# Patient Record
Sex: FEMALE | Race: WHITE
[De-identification: names, ages, dates, MRNs, and addresses within clinical notes are randomized per-mention and may not be internally consistent; named-entity substitution may affect disease eponyms.]

---

## 2020-05-12 ENCOUNTER — HOSPITAL ENCOUNTER (EMERGENCY)
Dept: HOSPITAL 12 - ER | Age: 35
Discharge: HOME | End: 2020-05-12
Payer: COMMERCIAL

## 2020-05-12 VITALS — WEIGHT: 150 LBS | HEIGHT: 67 IN | BODY MASS INDEX: 23.54 KG/M2

## 2020-05-12 VITALS — DIASTOLIC BLOOD PRESSURE: 68 MMHG | SYSTOLIC BLOOD PRESSURE: 119 MMHG

## 2020-05-12 DIAGNOSIS — R05: Primary | ICD-10-CM

## 2020-05-12 DIAGNOSIS — R06.2: ICD-10-CM

## 2020-05-12 PROCEDURE — A4663 DIALYSIS BLOOD PRESSURE CUFF: HCPCS

## 2020-05-12 NOTE — NUR
Patient discharged to home in stable condition.  Written and verbal after care 
instructions given. 

Patient verbalizes understanding of instructions. Stressed follow up or return 
to ER for worsening s/s.All belongings w/patient.

## 2020-05-12 NOTE — NUR
PT PRESENTED TO ER IN STABLE CONDITION W/STEADY GAIT C/O CHRONIC COUGH THATS 
BEEN WORSE YESTDAY. PT IS A/OX4, NO NEURO DEFICIT NOTED. REGULAR HR AND RHYTHM. 
LUNG SOUNDS CLEAR , NO SOB NOTED. NO GI/ COMPLAINTS.